# Patient Record
Sex: MALE | Race: BLACK OR AFRICAN AMERICAN | Employment: STUDENT | URBAN - NONMETROPOLITAN AREA
[De-identification: names, ages, dates, MRNs, and addresses within clinical notes are randomized per-mention and may not be internally consistent; named-entity substitution may affect disease eponyms.]

---

## 2023-08-01 LAB
CHLAMYDIA TRACH., AMPLIFIED: NEGATIVE
N. GONORRHEA, AMPLIFIED: NEGATIVE

## 2023-11-29 ENCOUNTER — HOSPITAL ENCOUNTER (EMERGENCY)
Age: 23
Discharge: HOME | End: 2023-11-29
Payer: COMMERCIAL

## 2023-11-29 ENCOUNTER — APPOINTMENT (OUTPATIENT)
Dept: GENERAL RADIOLOGY | Age: 23
End: 2023-11-29
Payer: COMMERCIAL

## 2023-11-29 VITALS
WEIGHT: 165 LBS | SYSTOLIC BLOOD PRESSURE: 122 MMHG | DIASTOLIC BLOOD PRESSURE: 82 MMHG | HEIGHT: 71 IN | HEART RATE: 79 BPM | OXYGEN SATURATION: 98 % | RESPIRATION RATE: 17 BRPM | BODY MASS INDEX: 23.1 KG/M2

## 2023-11-29 DIAGNOSIS — S61.411A LACERATION OF RIGHT HAND WITHOUT FOREIGN BODY, INITIAL ENCOUNTER: Primary | ICD-10-CM

## 2023-11-29 PROCEDURE — 6370000000 HC RX 637 (ALT 250 FOR IP): Performed by: STUDENT IN AN ORGANIZED HEALTH CARE EDUCATION/TRAINING PROGRAM

## 2023-11-29 PROCEDURE — 12001 RPR S/N/AX/GEN/TRNK 2.5CM/<: CPT

## 2023-11-29 PROCEDURE — 96374 THER/PROPH/DIAG INJ IV PUSH: CPT

## 2023-11-29 PROCEDURE — 99284 EMERGENCY DEPT VISIT MOD MDM: CPT

## 2023-11-29 PROCEDURE — 6360000002 HC RX W HCPCS

## 2023-11-29 PROCEDURE — 73130 X-RAY EXAM OF HAND: CPT

## 2023-11-29 RX ORDER — ONDANSETRON 2 MG/ML
4 INJECTION INTRAMUSCULAR; INTRAVENOUS ONCE
Status: COMPLETED | OUTPATIENT
Start: 2023-11-29 | End: 2023-11-29

## 2023-11-29 RX ORDER — BACITRACIN ZINC 500 [USP'U]/G
OINTMENT TOPICAL ONCE
Status: COMPLETED | OUTPATIENT
Start: 2023-11-29 | End: 2023-11-29

## 2023-11-29 RX ORDER — ONDANSETRON 2 MG/ML
INJECTION INTRAMUSCULAR; INTRAVENOUS
Status: COMPLETED
Start: 2023-11-29 | End: 2023-11-29

## 2023-11-29 RX ADMIN — BACITRACIN ZINC: 500 OINTMENT TOPICAL at 02:00

## 2023-11-29 RX ADMIN — ONDANSETRON 4 MG: 2 INJECTION INTRAMUSCULAR; INTRAVENOUS at 01:22

## 2023-11-29 ASSESSMENT — ENCOUNTER SYMPTOMS
TROUBLE SWALLOWING: 0
SHORTNESS OF BREATH: 0
VOMITING: 0
BACK PAIN: 0
COLOR CHANGE: 0
SINUS PRESSURE: 0
SINUS PAIN: 0
EYE DISCHARGE: 0
CHEST TIGHTNESS: 0
FACIAL SWELLING: 0
ABDOMINAL PAIN: 0
EYE PAIN: 0
COUGH: 0
NAUSEA: 0

## 2023-11-29 ASSESSMENT — LIFESTYLE VARIABLES
HOW MANY STANDARD DRINKS CONTAINING ALCOHOL DO YOU HAVE ON A TYPICAL DAY: PATIENT DOES NOT DRINK
HOW OFTEN DO YOU HAVE A DRINK CONTAINING ALCOHOL: NEVER

## 2023-11-29 NOTE — ED NOTES
Large laceration noted across palm of right hand, pt stated he punched glass window PTA. Squirting of blood noted upon release of pressure from dressing. Pressure applied to wound. Dr. aDisy Aquino present [facility].       Minesh Mcgill RN  11/29/23 [id number]

## 2023-11-29 NOTE — ED PROVIDER NOTES
[address] [facility] ED  EMERGENCY DEPARTMENT ENCOUNTER      Pt Name: Kenyon Maradiaga  MRN: [id number]  [address] [facility] [address]  Date of evaluation: 11/29/2023  Provider: Ember Dennis MD     CHIEF COMPLAINT       Chief Complaint   Patient presents with    Laceration     Right hand, Punched glass window PTA, large amount of blood noted, right hand cold to touch          HISTORY OF PRESENT ILLNESS   (Location/Symptom, Timing/Onset, Context/Setting, Quality, Duration, Modifying Factors, Severity) Note limiting factors. HPI    Kenyon Maradiaga is a 21 y.o. male, with no significant history who presents to the emergency department for evaluation for laceration to the right hand. Patient states that he punched through glass. He states initially he did not notice anything but then started bleeding following which he relates he had a cut on his palm. States he could not stop the bleeding at home and is coming to the emergency department for evaluation. He states the incident occurred approximately 30 minutes prior to arrival.  Endorses coolness to touch of the fingers and numbness and tingling. He denies color change. Nursing Notes were reviewed. REVIEW OF SYSTEMS    (2+ for level 4; 10+ for level 5)   Review of Systems   Constitutional:  Negative for activity change, appetite change and diaphoresis. HENT:  Negative for congestion, facial swelling, sinus pressure, sinus pain, sneezing and trouble swallowing. Eyes:  Negative for pain, discharge and visual disturbance. Respiratory:  Negative for cough, chest tightness and shortness of breath. Cardiovascular:  Negative for chest pain and palpitations. Gastrointestinal:  Negative for abdominal pain, nausea and vomiting. Genitourinary:  Negative for decreased urine volume, dysuria, hematuria and urgency. Musculoskeletal:  Negative for back pain, neck pain and neck stiffness. Skin:  Positive for wound (Right palm).  Negative for color change, pallor

## 2025-07-15 ENCOUNTER — TELEPHONE (OUTPATIENT)
Dept: SLEEP MEDICINE | Facility: HOSPITAL | Age: 25
End: 2025-07-15

## 2025-07-15 NOTE — TELEPHONE ENCOUNTER
Received npv referral for pt to be seen in sleep med. Reached out but number was disconnect and no other numbers are on file/referral. 7/15 MJ